# Patient Record
Sex: FEMALE | Race: WHITE | NOT HISPANIC OR LATINO
[De-identification: names, ages, dates, MRNs, and addresses within clinical notes are randomized per-mention and may not be internally consistent; named-entity substitution may affect disease eponyms.]

---

## 2020-11-25 VITALS
SYSTOLIC BLOOD PRESSURE: 120 MMHG | WEIGHT: 237 LBS | TEMPERATURE: 98.2 F | DIASTOLIC BLOOD PRESSURE: 80 MMHG | HEART RATE: 80 BPM | BODY MASS INDEX: 44.17 KG/M2 | HEIGHT: 61.5 IN

## 2021-09-18 ENCOUNTER — APPOINTMENT (OUTPATIENT)
Dept: PEDIATRICS | Facility: CLINIC | Age: 21
End: 2021-09-18
Payer: COMMERCIAL

## 2021-09-18 ENCOUNTER — TRANSCRIPTION ENCOUNTER (OUTPATIENT)
Age: 21
End: 2021-09-18

## 2021-09-18 VITALS — WEIGHT: 254.13 LBS | TEMPERATURE: 98.6 F | HEIGHT: 61.5 IN | BODY MASS INDEX: 47.37 KG/M2

## 2021-09-18 DIAGNOSIS — L83 ACANTHOSIS NIGRICANS: ICD-10-CM

## 2021-09-18 DIAGNOSIS — E66.01 MORBID (SEVERE) OBESITY DUE TO EXCESS CALORIES: ICD-10-CM

## 2021-09-18 DIAGNOSIS — B34.9 VIRAL INFECTION, UNSPECIFIED: ICD-10-CM

## 2021-09-18 DIAGNOSIS — J02.9 ACUTE PHARYNGITIS, UNSPECIFIED: ICD-10-CM

## 2021-09-18 DIAGNOSIS — F32.9 MAJOR DEPRESSIVE DISORDER, SINGLE EPISODE, UNSPECIFIED: ICD-10-CM

## 2021-09-18 PROBLEM — Z00.00 ENCOUNTER FOR PREVENTIVE HEALTH EXAMINATION: Status: ACTIVE | Noted: 2021-09-18

## 2021-09-18 LAB — S PYO AG SPEC QL IA: NEGATIVE

## 2021-09-18 PROCEDURE — 99213 OFFICE O/P EST LOW 20 MIN: CPT

## 2021-09-18 PROCEDURE — 87880 STREP A ASSAY W/OPTIC: CPT | Mod: QW

## 2021-09-18 RX ORDER — BUPROPION HYDROCHLORIDE 75 MG/1
75 TABLET, FILM COATED ORAL
Qty: 180 | Refills: 0 | Status: ACTIVE | COMMUNITY
Start: 2021-09-15

## 2021-09-18 NOTE — HISTORY OF PRESENT ILLNESS
[FreeTextEntry6] : Cold sx, cough and congestion for 1 week\par ST and fatigue for past 2 days\par No V or D\par No fever\par Lives on [facility] w sick contacts\par fully vaccinated\par

## 2021-09-18 NOTE — PHYSICAL EXAM
[Mucoid Discharge] : mucoid discharge [Erythematous Oropharynx] : erythematous oropharynx [Exudate] : exudate [NL] : warm

## 2021-09-18 NOTE — DISCUSSION/SUMMARY
[FreeTextEntry1] : Patient likely with viral pharyngitis. Rapid strep preformed in office is negative. Will send throat culture to rule out strep. Recommend supportive care with antipyretics, salt water gargles, and if age-appropriate throat lozenges.\par \par NASAL SWAB PCR:  This test detects the virus and is a sign of active infection. This test is used to diagnose COVID-19 virus. You do not need to have any signs of being sick to be infected. You can give the virus to others without knowing.\par \par Lab results can take 24-48 hours (depending on volume of tests)\par \par PCR Positive Results:  means the virus was found in the nasal passages and you are infected with the COVID-19 virus. Per CDC guidelines\par 1- Self isolate at home, except to get medical care - call 911 in case of emergency\par 2- Monitor your symptoms and if you have any of these emergency warning signs - get medical attention immediately:\par \par Trouble breathing\par Persistent cough, pain or pressure in chest\par New confusion, lethargy\par Blue lips or face\par \par PCR Negative Results:  means the virus was not found. A negative results means you probably were not infected at the time your sample was collected.  However, that does not mean you will not get sick.  It is possible that you were very early in your infection when your sample was collected and that you could test positive later. OR you could be exposed later and then develop illness. A negative test does not mean you wont get sick later. This means you could still spread the virus  Please continue to wear a mask, hand wash, and continue to social distance.\par \par \par Use humidifier, saline nasal drops, encourage fluids and fever control as needed. Elevate head of bed. Return for spiking fever, worsening symptoms, respiratory distress or concerns.\par \par \par Discussed dx of MONO - if ST and fatigue persist, advised to return to office in 2-3 days for blood work\par too early to test today\par \par RTO PRN advised on signs and symptoms requiring re evaluation.\par \par

## 2021-09-19 LAB
HMPV RNA SPEC QL NAA+PROBE: DETECTED
RAPID RVP RESULT: DETECTED
SARS-COV-2 RNA PNL RESP NAA+PROBE: NOT DETECTED

## 2021-09-21 LAB — BACTERIA THROAT CULT: NORMAL
